# Patient Record
Sex: FEMALE | Race: WHITE | NOT HISPANIC OR LATINO | Employment: FULL TIME | URBAN - METROPOLITAN AREA
[De-identification: names, ages, dates, MRNs, and addresses within clinical notes are randomized per-mention and may not be internally consistent; named-entity substitution may affect disease eponyms.]

---

## 2017-02-08 ENCOUNTER — ALLSCRIPTS OFFICE VISIT (OUTPATIENT)
Dept: OTHER | Facility: OTHER | Age: 57
End: 2017-02-08

## 2017-07-18 ENCOUNTER — HOSPITAL ENCOUNTER (OUTPATIENT)
Dept: RADIOLOGY | Age: 57
Discharge: HOME | End: 2017-07-18
Payer: COMMERCIAL

## 2017-07-18 DIAGNOSIS — Z12.31 ENCOUNTER FOR SCREENING MAMMOGRAM FOR MALIGNANT NEOPLASM OF BREAST: ICD-10-CM

## 2017-07-18 PROCEDURE — G0202 SCR MAMMO BI INCL CAD: HCPCS

## 2017-07-19 ENCOUNTER — GENERIC CONVERSION - ENCOUNTER (OUTPATIENT)
Dept: OTHER | Facility: OTHER | Age: 57
End: 2017-07-19

## 2017-08-10 ENCOUNTER — ALLSCRIPTS OFFICE VISIT (OUTPATIENT)
Dept: OTHER | Facility: OTHER | Age: 57
End: 2017-08-10

## 2018-01-12 VITALS
SYSTOLIC BLOOD PRESSURE: 130 MMHG | DIASTOLIC BLOOD PRESSURE: 72 MMHG | WEIGHT: 227 LBS | BODY MASS INDEX: 40.22 KG/M2 | HEIGHT: 63 IN

## 2018-01-14 VITALS
DIASTOLIC BLOOD PRESSURE: 78 MMHG | SYSTOLIC BLOOD PRESSURE: 114 MMHG | OXYGEN SATURATION: 97 % | TEMPERATURE: 100 F | BODY MASS INDEX: 38.68 KG/M2 | RESPIRATION RATE: 14 BRPM | HEIGHT: 63 IN | WEIGHT: 218.31 LBS | HEART RATE: 88 BPM

## 2018-04-18 ENCOUNTER — OFFICE VISIT (OUTPATIENT)
Dept: SURGICAL ONCOLOGY | Facility: CLINIC | Age: 58
End: 2018-04-18
Payer: COMMERCIAL

## 2018-04-18 VITALS
RESPIRATION RATE: 16 BRPM | HEIGHT: 63 IN | DIASTOLIC BLOOD PRESSURE: 70 MMHG | SYSTOLIC BLOOD PRESSURE: 130 MMHG | WEIGHT: 231 LBS | BODY MASS INDEX: 40.93 KG/M2 | TEMPERATURE: 98.2 F | HEART RATE: 78 BPM

## 2018-04-18 DIAGNOSIS — N60.19 FIBROCYSTIC BREAST CHANGES, UNSPECIFIED LATERALITY: Primary | ICD-10-CM

## 2018-04-18 DIAGNOSIS — Z12.31 SCREENING MAMMOGRAM, ENCOUNTER FOR: ICD-10-CM

## 2018-04-18 DIAGNOSIS — Z80.3 FAMILY HISTORY OF BREAST CANCER IN FEMALE: ICD-10-CM

## 2018-04-18 PROCEDURE — 99213 OFFICE O/P EST LOW 20 MIN: CPT | Performed by: SURGERY

## 2018-04-18 RX ORDER — LEVOTHYROXINE SODIUM 0.05 MG/1
TABLET ORAL
COMMUNITY

## 2018-04-18 RX ORDER — SIMVASTATIN 20 MG
TABLET ORAL
COMMUNITY

## 2018-04-18 RX ORDER — ALBUTEROL SULFATE 90 UG/1
AEROSOL, METERED RESPIRATORY (INHALATION)
COMMUNITY
Start: 2018-01-27

## 2018-04-18 RX ORDER — FENOFIBRATE 67 MG/1
CAPSULE ORAL
COMMUNITY

## 2018-04-18 RX ORDER — CHLORTHALIDONE 25 MG/1
TABLET ORAL
Refills: 1 | COMMUNITY
Start: 2018-02-20

## 2018-04-18 RX ORDER — AMLODIPINE BESYLATE 5 MG/1
TABLET ORAL
COMMUNITY
Start: 2017-11-27

## 2018-04-18 NOTE — PROGRESS NOTES
Surgical Oncology Follow Up       [facility]  [facility] SURGICAL ONCOLOGY [address]  [place] [address] [address]    Patricia Stagers  1960  [id number]  [address]  C[facility]ER [facility] SURGICAL ONCOLOGY [place]  [address] [address]    Chief Complaint   Patient presents with    Breast Problem     Pt is here for 1 year follow up        Assessment/Plan   Diagnoses and all orders for this visit:    Fibrocystic breast changes, unspecified laterality    Family history of breast cancer in female    Screening mammogram, encounter for  -     Mammo screening bilateral w 3d & cad; Future    Other orders  -     albuterol (PROVENTIL HFA,VENTOLIN HFA) 90 mcg/act inhaler; Take 1-2 puffs every 4-6 hours as needed for chest tightness  -     amLODIPine (NORVASC) 5 mg tablet; TAKE 1 TABLET (5 MG TOTAL) BY MOUTH DAILY  -     chlorthalidone 25 mg tablet; TAKE 1 TABLET (25 MG TOTAL) BY MOUTH DAILY  -     fenofibrate micronized (LOFIBRA) 67 MG capsule; Take by mouth  -     levothyroxine 50 mcg tablet; Take by mouth  -     simvastatin (ZOCOR) 20 mg tablet; Take by mouth        Advance Care Planning/Advance Directives:  Did not discuss  with the patient  Oncology History:     No history exists  History of Present Illness: fibrocystic, family history, dense tissue  -Interval History:none    Review of Systems:  Review of Systems   Constitutional: Negative  Negative for appetite change and fever  Eyes: Negative  Respiratory: Negative for shortness of breath  Cardiovascular: Negative  Gastrointestinal: Negative  Endocrine: Negative  Genitourinary: Negative  Musculoskeletal: Negative  Negative for arthralgias and myalgias  Skin: Negative  Allergic/Immunologic: Negative  Neurological: Negative  Hematological: Negative  Negative for adenopathy  Does not bruise/bleed easily  Psychiatric/Behavioral: Negative          Patient Active Problem List Diagnosis    Fibrocystic breast changes    Family history of breast cancer in female    Screening mammogram, encounter for     Past Medical History:   Diagnosis Date    Asthma     Candidiasis     Depression     Disease of thyroid gland     Dysuria     Fibrocystic breast changes     H/O allergy     Hyperlipidemia     Hypertension     Mitral valve prolapse      Past Surgical History:   Procedure Laterality Date    HEEL SPUR SURGERY Bilateral     [id number]    SHOULDER SURGERY      FROZEN SHOULDER     Family History   Problem Relation Age of Onset    Breast cancer Mother 79     ADOPTED MOTHER     Lung cancer Father      UNKNOWN      Social History     Social History    Marital status: /Civil Union     Spouse name: N/A    Number of children: N/A    Years of education: N/A     Occupational History    Not on file  Social History Main Topics    Smoking status: Never Smoker    Smokeless tobacco: Never Used    Alcohol use No    Drug use: No    Sexual activity: Not on file     Other Topics Concern    Not on file     Social History Narrative    No narrative on file       Current Outpatient Prescriptions:     albuterol (PROVENTIL HFA,VENTOLIN HFA) 90 mcg/act inhaler, Take 1-2 puffs every 4-6 hours as needed for chest tightness  , Disp: , Rfl:     amLODIPine (NORVASC) 5 mg tablet, TAKE 1 TABLET (5 MG TOTAL) BY MOUTH DAILY  , Disp: , Rfl:     chlorthalidone 25 mg tablet, TAKE 1 TABLET (25 MG TOTAL) BY MOUTH DAILY  , Disp: , Rfl: 1    fenofibrate micronized (LOFIBRA) 67 MG capsule, Take by mouth, Disp: , Rfl:     levothyroxine 50 mcg tablet, Take by mouth, Disp: , Rfl:     simvastatin (ZOCOR) 20 mg tablet, Take by mouth, Disp: , Rfl:   Allergies   Allergen Reactions    Ace Inhibitors     Avelox [Moxifloxacin]     Erythromycin        The following portions of the patient's history were reviewed and updated as appropriate: allergies, current medications, past family history, past medical history, past social history, past surgical history and problem list         Vitals:    04/18/18 1527   BP: 130/70   Pulse: 78   Resp: 16   Temp: 98 2 °F (36 8 °C)       Physical Exam   Constitutional: She is oriented to person, place, and time  She appears well-developed and well-nourished  HENT:   Head: Normocephalic and atraumatic  Pulmonary/Chest: Right breast exhibits no inverted nipple, no mass, no nipple discharge, no skin change and no tenderness  Left breast exhibits no inverted nipple, no mass, no nipple discharge, no skin change and no tenderness  Lymphadenopathy:        Right axillary: No pectoral and no lateral adenopathy present  Left axillary: No pectoral and no lateral adenopathy present  Right: No supraclavicular adenopathy present  Left: No supraclavicular adenopathy present  Neurological: She is alert and oriented to person, place, and time  Psychiatric: She has a normal mood and affect  Results:      Imaging  07/18/2017 bilateral screening mammogram is benign BI-RADS one density three    I reviewed the above imaging data  Discussion/Summary:  10-year-old female with fibrocystic changes and dense breast tissue  She also has a family history of breast cancer in her mother  There are no concerns on examination today  Her mammogram last year was benign  I will make arrangements for a mammogram for this year  I am recommending a 3D mammogram given the dense tissue  Provided there are no concerns, I will see her again in one year for a clinical exam or sooner should the need arise

## 2018-07-24 ENCOUNTER — HOSPITAL ENCOUNTER (OUTPATIENT)
Dept: MAMMOGRAPHY | Facility: MEDICAL CENTER | Age: 58
Discharge: HOME | End: 2018-07-24
Payer: COMMERCIAL

## 2018-07-24 DIAGNOSIS — Z12.31 SCREENING MAMMOGRAM, ENCOUNTER FOR: ICD-10-CM

## 2018-07-24 PROCEDURE — 77067 SCR MAMMO BI INCL CAD: CPT

## 2018-07-24 PROCEDURE — 77063 BREAST TOMOSYNTHESIS BI: CPT

## 2018-09-13 ENCOUNTER — ANNUAL EXAM (OUTPATIENT)
Dept: OBGYN CLINIC | Facility: MEDICAL CENTER | Age: 58
End: 2018-09-13
Payer: COMMERCIAL

## 2018-09-13 VITALS — WEIGHT: 233 LBS | BODY MASS INDEX: 41.27 KG/M2 | DIASTOLIC BLOOD PRESSURE: 74 MMHG | SYSTOLIC BLOOD PRESSURE: 132 MMHG

## 2018-09-13 DIAGNOSIS — T75.3XXD MOTION SICKNESS, SUBSEQUENT ENCOUNTER: ICD-10-CM

## 2018-09-13 DIAGNOSIS — Z01.419 ENCOUNTER FOR WELL WOMAN EXAM WITH ROUTINE GYNECOLOGICAL EXAM: Primary | ICD-10-CM

## 2018-09-13 PROCEDURE — S0612 ANNUAL GYNECOLOGICAL EXAMINA: HCPCS | Performed by: OBSTETRICS & GYNECOLOGY

## 2018-09-13 RX ORDER — SCOLOPAMINE TRANSDERMAL SYSTEM 1 MG/1
1 PATCH, EXTENDED RELEASE TRANSDERMAL
Qty: 6 PATCH | Refills: 0 | Status: SHIPPED | OUTPATIENT
Start: 2018-09-13 | End: 2019-04-10 | Stop reason: HOSPADM

## 2018-09-13 NOTE — PROGRESS NOTES
ASSESSMENT & PLAN: Brittany Maurice is a 60 y o   with normal gynecologic exam     1   Routine well woman exam done today  2  Pap and HPV:  The patient's last pap and hpv was   It was normal     Pap and cotesting was not done today  Current ASCCP Guidelines reviewed  Due   3  Mammogram ordered - follows with Dr Micah Hays  4  Colonoscopy due in 1 year  5  The following were reviewed in today's visit: breast self exam      CC:  Annual Gynecologic Examination    HPI: Brittany Maurice is a 60 y o   who presents for annual gynecologic examination  She has the following concerns:  none    Health Maintenance:    She wears her seatbelt routinely  She does perform regular monthly self breast exams  She feels safe at home  Pap: 2015  Last mammogram: 2017  Last colonoscopy: due 2019    Past Medical History:   Diagnosis Date    Asthma     Candidiasis     Depression     Disease of thyroid gland     Dysuria     Fibrocystic breast changes     H/O allergy     Hyperlipidemia     Hypertension     Mitral valve prolapse        Past Surgical History:   Procedure Laterality Date    HEEL SPUR SURGERY Bilateral     [id number]    SHOULDER SURGERY      FROZEN SHOULDER       Past OB/Gyn History:  OB History      Para Term  AB Living    1 0            SAB TAB Ectopic Multiple Live Births                     Obstetric Comments    MENARCHE:AGE 10   NO LIVE BIRTHS  NEVER USED HORMONE REPLACEMENTS  NO BIRTH CONTROL METHODS  Pt does not have menstrual issues      History of sexually transmitted infection: No   History of abnormal pap smears: No          Family History   Problem Relation Age of Onset    Breast cancer Mother 79        ADOPTED MOTHER     Lung cancer Father         UNKNOWN        Social History:  Social History     Social History    Marital status: /Civil Union     Spouse name: N/A    Number of children: N/A    Years of education: N/A     Occupational History    Not on file  Social History Main Topics    Smoking status: Never Smoker    Smokeless tobacco: Never Used    Alcohol use No    Drug use: No    Sexual activity: No     Other Topics Concern    Not on file     Social History Narrative    No narrative on file       Allergies   Allergen Reactions    Ace Inhibitors     Avelox [Moxifloxacin]     Erythromycin        Current Outpatient Prescriptions:     amLODIPine (NORVASC) 5 mg tablet, TAKE 1 TABLET (5 MG TOTAL) BY MOUTH DAILY  , Disp: , Rfl:     chlorthalidone 25 mg tablet, TAKE 1 TABLET (25 MG TOTAL) BY MOUTH DAILY  , Disp: , Rfl: 1    fenofibrate micronized (LOFIBRA) 67 MG capsule, Take by mouth, Disp: , Rfl:     levothyroxine 50 mcg tablet, Take by mouth, Disp: , Rfl:     simvastatin (ZOCOR) 20 mg tablet, Take by mouth, Disp: , Rfl:     albuterol (PROVENTIL HFA,VENTOLIN HFA) 90 mcg/act inhaler, Take 1-2 puffs every 4-6 hours as needed for chest tightness  , Disp: , Rfl:     scopolamine (TRANSDERM-SCOP) 1 5 mg/3 days TD 72 hr patch, Place 1 patch on the skin every third day, Disp: 6 patch, Rfl: 0      Review of Systems  Constitutional :no fever, feels well, no tiredness, no recent weight gain or loss  ENT: no ear ache, no loss of hearing, no nosebleeds or nasal discharge, no sore throat or hoarseness  Cardiovascular: no complaints of slow or fast heart beat, no chest pain, no palpitations, no leg claudication or lower extremity edema  Respiratory: no complaints of shortness of shortness of breath, no BAPTISTE  Breasts:no complaints of breast pain, breast lump, or nipple discharge  Gastrointestinal: no complaints of abdominal pain, constipation, nausea, vomiting, or diarrhea or bloody stools  Genitourinary : no complaints of dysuria, incontinence, pelvic pain, no dysmenorrhea, vaginal discharge or abnormal vaginal bleeding and as noted in HPI    Musculoskeletal: no complaints of arthralgia, no myalgia, no joint swelling or stiffness, no limb pain or swelling  Integumentary: no complaints of skin rash or lesion, itching or dry skin  Neurological: no complaints of headache, no confusion, no numbness or tingling, no dizziness or fainting    Objective      /74   Wt 106 kg (233 lb)   BMI 41 27 kg/m²     General:   appears stated age, cooperative, alert normal mood and affect   Neck: normal, supple,trachea midline, no masses   Heart: regular rate and rhythm, S1, S2 normal, no murmur, click, rub or gallop   Lungs: clear to auscultation bilaterally   Breasts: normal appearance, no masses or tenderness, Inspection negative, No nipple retraction or dimpling, No nipple discharge or bleeding, No axillary or supraclavicular adenopathy, Normal to palpation without dominant masses   Abdomen: soft, non-tender, without masses or organomegaly   Vulva: normal female genitalia, Bartholin's, Urethra, [place] normal, no lesions, normal female hair distribution   Vagina: normal vagina, no discharge, exudate, lesion, or erythema   Urethra: normal   Cervix: Normal, no discharge  Uterus: normal size, contour, position, consistency, mobility, non-tender   Adnexa: normal adnexa   Lymphatic palpation of lymph nodes in neck, axilla, groin and/or other locations: no lymphadenopathy or masses noted   Skin normal skin turgor and no rashes     Psychiatric orientation to person, place, and time: normal  mood and affect: normal

## 2019-04-10 ENCOUNTER — OFFICE VISIT (OUTPATIENT)
Dept: SURGICAL ONCOLOGY | Facility: CLINIC | Age: 59
End: 2019-04-10
Payer: COMMERCIAL

## 2019-04-10 VITALS
TEMPERATURE: 99.5 F | HEART RATE: 83 BPM | DIASTOLIC BLOOD PRESSURE: 90 MMHG | RESPIRATION RATE: 14 BRPM | WEIGHT: 239 LBS | SYSTOLIC BLOOD PRESSURE: 140 MMHG | HEIGHT: 63 IN | BODY MASS INDEX: 42.35 KG/M2

## 2019-04-10 DIAGNOSIS — Z12.31 SCREENING MAMMOGRAM, ENCOUNTER FOR: ICD-10-CM

## 2019-04-10 DIAGNOSIS — N60.19 FIBROCYSTIC BREAST CHANGES, UNSPECIFIED LATERALITY: Primary | ICD-10-CM

## 2019-04-10 DIAGNOSIS — Z80.3 FAMILY HISTORY OF BREAST CANCER IN FEMALE: ICD-10-CM

## 2019-04-10 PROCEDURE — 99213 OFFICE O/P EST LOW 20 MIN: CPT | Performed by: SURGERY

## 2019-04-10 RX ORDER — METHYLPREDNISOLONE 4 MG/1
TABLET ORAL
COMMUNITY
Start: 2019-04-10 | End: 2019-04-16

## 2019-08-04 DIAGNOSIS — Z12.31 SCREENING MAMMOGRAM, ENCOUNTER FOR: ICD-10-CM

## 2019-09-18 ENCOUNTER — ANNUAL EXAM (OUTPATIENT)
Dept: OBGYN CLINIC | Facility: MEDICAL CENTER | Age: 59
End: 2019-09-18
Payer: COMMERCIAL

## 2019-09-18 VITALS — SYSTOLIC BLOOD PRESSURE: 122 MMHG | DIASTOLIC BLOOD PRESSURE: 62 MMHG | BODY MASS INDEX: 42.37 KG/M2 | WEIGHT: 239.2 LBS

## 2019-09-18 DIAGNOSIS — Z12.31 ENCOUNTER FOR SCREENING MAMMOGRAM FOR MALIGNANT NEOPLASM OF BREAST: ICD-10-CM

## 2019-09-18 DIAGNOSIS — Z01.419 ENCOUNTER FOR WELL WOMAN EXAM WITH ROUTINE GYNECOLOGICAL EXAM: Primary | ICD-10-CM

## 2019-09-18 PROCEDURE — S0612 ANNUAL GYNECOLOGICAL EXAMINA: HCPCS | Performed by: OBSTETRICS & GYNECOLOGY

## 2019-09-18 NOTE — PATIENT INSTRUCTIONS
Thank you for your confidence in our team    We appreciate you and welcome your feedback  If you receive a survey from us, please take a few moments to let us know how we are doing     Sincerely,   Osvaldo Grider MD

## 2019-09-18 NOTE — PROGRESS NOTES
ASSESSMENT & PLAN: Raciel Chandler is a 60 y o   with normal gynecologic exam     1   Routine well woman exam done today  2  Pap and HPV:  The patient's last pap and hpv was   It was normal     Pap and cotesting was not done today  Current ASCCP Guidelines reviewed  Due   3  Mammogram ordered - follows with Dr Alesha Mercado  4  Colonoscopy done   5  The following were reviewed in today's visit: breast self exam      CC:  Annual Gynecologic Examination    HPI: Raciel Chandler is a 60 y o   who presents for annual gynecologic examination  She has the following concerns:  none    Health Maintenance:    She wears her seatbelt routinely  She does perform regular monthly self breast exams  She feels safe at home  Pap:   Last mammogram: 2019  Last colonoscopy:     Past Medical History:   Diagnosis Date    Asthma     Candidiasis     Depression     Disease of thyroid gland     Dysuria     Fibrocystic breast changes     H/O allergy     Hyperlipidemia     Hypertension     Mitral valve prolapse        Past Surgical History:   Procedure Laterality Date    HEEL SPUR SURGERY Bilateral     [id number]    SHOULDER SURGERY      FROZEN SHOULDER       Past OB/Gyn History:  OB History        1    Para   0    Term                AB   1    Living   0       SAB   1    TAB        Ectopic        Multiple        Live Births   0           Obstetric Comments   MENARCHE:AGE 10   NO LIVE BIRTHS  NEVER USED HORMONE REPLACEMENTS  NO BIRTH CONTROL METHODS  Pt does not have menstrual issues      History of sexually transmitted infection: No   History of abnormal pap smears: No          Family History   Problem Relation Age of Onset   [place] Breast cancer Mother 79        Mother was adopted    Lung cancer Father         UNKNOWN        Social History:  Social History     Socioeconomic History    Marital status: /Civil Union     Spouse name: Not on file    Number of children: Not on file    Years of education: Not on file    Highest education level: Not on file   Occupational History    Not on file   Social Needs    Financial resource strain: Not on file    Food insecurity:     Worry: Not on file     Inability: Not on file    Transportation needs:     Medical: Not on file     Non-medical: Not on file   Tobacco Use    Smoking status: Never Smoker    Smokeless tobacco: Never Used   Substance and Sexual Activity    Alcohol use: No    Drug use: No    Sexual activity: Never     Partners: Male     Birth control/protection: Post-menopausal   Lifestyle    Physical activity:     Days per week: Not on file     Minutes per session: Not on file    Stress: Not on file   Relationships    Social connections:     Talks on phone: Not on file     Gets together: Not on file     Attends [employer] service: Not on file     Active member of club or organization: Not on file     Attends meetings of clubs or organizations: Not on file     Relationship status: Not on file    Intimate partner violence:     Fear of current or ex partner: Not on file     Emotionally abused: Not on file     Physically abused: Not on file     Forced sexual activity: Not on file   Other Topics Concern    Not on file   Social History Narrative    Not on file       Allergies   Allergen Reactions    Ace Inhibitors     Avelox [Moxifloxacin]     Erythromycin        Current Outpatient Medications:     albuterol (PROVENTIL HFA,VENTOLIN HFA) 90 mcg/act inhaler, Take 1-2 puffs every 4-6 hours as needed for chest tightness  , Disp: , Rfl:     amLODIPine (NORVASC) 5 mg tablet, TAKE 1 TABLET (5 MG TOTAL) BY MOUTH DAILY  , Disp: , Rfl:     chlorthalidone 25 mg tablet, TAKE 1 TABLET (25 MG TOTAL) BY MOUTH DAILY  , Disp: , Rfl: 1    fenofibrate micronized (LOFIBRA) 67 MG capsule, Take by mouth, Disp: , Rfl:     levothyroxine 50 mcg tablet, Take by mouth, Disp: , Rfl:     simvastatin (ZOCOR) 20 mg tablet, Take by mouth, Disp: , Rfl:       Review of Systems  Constitutional :no fever, feels well, no tiredness, no recent weight gain or loss  ENT: no ear ache, no loss of hearing, no nosebleeds or nasal discharge, no sore throat or hoarseness  Cardiovascular: no complaints of slow or fast heart beat, no chest pain, no palpitations, no leg claudication or lower extremity edema  Respiratory: no complaints of shortness of shortness of breath, no BAPTISTE  Breasts:no complaints of breast pain, breast lump, or nipple discharge  Gastrointestinal: no complaints of abdominal pain, constipation, nausea, vomiting, or diarrhea or bloody stools  Genitourinary : no complaints of dysuria, incontinence, pelvic pain, no dysmenorrhea, vaginal discharge or abnormal vaginal bleeding and as noted in HPI  Musculoskeletal: no complaints of arthralgia, no myalgia, no joint swelling or stiffness, no limb pain or swelling  Integumentary: no complaints of skin rash or lesion, itching or dry skin  Neurological: no complaints of headache, no confusion, no numbness or tingling, no dizziness or fainting    Objective      /62   Wt 109 kg (239 lb 3 2 oz)   BMI 42 37 kg/m²     General:   appears stated age, cooperative, alert normal mood and affect   Neck: normal, supple,trachea midline, no masses   Heart: regular rate and rhythm, S1, S2 normal, no murmur, click, rub or gallop   Lungs: clear to auscultation bilaterally   Breasts: normal appearance, no masses or tenderness, Inspection negative, No nipple retraction or dimpling, No nipple discharge or bleeding, No axillary or supraclavicular adenopathy, Normal to palpation without dominant masses   Abdomen: soft, non-tender, without masses or organomegaly   Vulva: normal female genitalia, Bartholin's, Urethra, [place] normal, no lesions, normal female hair distribution   Vagina: normal vagina, no discharge, exudate, lesion, or erythema   Urethra: normal   Cervix: Normal, no discharge     Uterus: normal size, contour, position, consistency, mobility, non-tender   Adnexa: normal adnexa   Lymphatic palpation of lymph nodes in neck, axilla, groin and/or other locations: no lymphadenopathy or masses noted   Skin normal skin turgor and no rashes     Psychiatric orientation to person, place, and time: normal  mood and affect: normal

## 2020-07-30 ENCOUNTER — OFFICE VISIT (OUTPATIENT)
Dept: SURGICAL ONCOLOGY | Facility: CLINIC | Age: 60
End: 2020-07-30
Payer: COMMERCIAL

## 2020-07-30 VITALS
WEIGHT: 235 LBS | DIASTOLIC BLOOD PRESSURE: 78 MMHG | TEMPERATURE: 96.8 F | RESPIRATION RATE: 16 BRPM | HEART RATE: 45 BPM | SYSTOLIC BLOOD PRESSURE: 130 MMHG | HEIGHT: 63 IN | BODY MASS INDEX: 41.64 KG/M2

## 2020-07-30 DIAGNOSIS — N60.19 FIBROCYSTIC BREAST CHANGES, UNSPECIFIED LATERALITY: Primary | ICD-10-CM

## 2020-07-30 DIAGNOSIS — Z12.31 SCREENING MAMMOGRAM, ENCOUNTER FOR: ICD-10-CM

## 2020-07-30 DIAGNOSIS — Z80.3 FAMILY HISTORY OF BREAST CANCER IN FEMALE: ICD-10-CM

## 2020-07-30 PROCEDURE — 99213 OFFICE O/P EST LOW 20 MIN: CPT | Performed by: SURGERY

## 2020-07-30 RX ORDER — ATORVASTATIN CALCIUM 20 MG/1
TABLET, FILM COATED ORAL
COMMUNITY
Start: 2020-03-12

## 2020-07-30 NOTE — PROGRESS NOTES
Surgical Oncology Follow Up       [address] [address]  [facility] [address]    Arely Cary  1960  [id number]  [address] [facility]COL[facility] [place]  [place] [address]    No chief complaint on file  Assessment/Plan   Diagnoses and all orders for this visit:    Fibrocystic breast changes, unspecified laterality    Screening mammogram, encounter for  -     Mammo screening bilateral w 3d & cad; Future    Family history of breast cancer in female    Other orders  -     atorvastatin (LIPITOR) 20 mg tablet; atorvastatin 20 mg tablet  -     Dulaglutide (Trulicity) 1 5 IO/1 2SV SOPN; Trulicity 1 5 KZ/6 0 mL subcutaneous pen injector        Advance Care Planning/Advance Directives:  Did not discuss  with the patient  Oncology History:     No history exists  History of Present Illness: Follow-up visit secondary to fibrocystic changes and family history of breast cancer, no concerns  -Interval History:  Has not had her mammogram but states it is scheduled for this coming Monday    Review of Systems:  Review of Systems   Constitutional: Negative  Negative for appetite change and fever  Eyes: Negative  Respiratory: Negative for shortness of breath  Cardiovascular: Negative  Gastrointestinal: Negative  Endocrine: Negative  Genitourinary: Negative  Musculoskeletal: Negative  Negative for arthralgias and myalgias  Skin: Negative  Allergic/Immunologic: Negative  Neurological: Negative  Hematological: Negative  Negative for adenopathy  Does not bruise/bleed easily  Psychiatric/Behavioral: Negative          Patient Active Problem List   Diagnosis    Fibrocystic breast changes    Family history of breast cancer in female    Screening mammogram, encounter for     Past Medical History:   Diagnosis Date    Asthma     Candidiasis     Depression     Disease of thyroid gland     Dysuria     Fibrocystic breast changes     H/O allergy     Hyperlipidemia     Hypertension     Mitral valve prolapse      Past Surgical History:   Procedure Laterality Date    HEEL SPUR SURGERY Bilateral     [id number]    SHOULDER SURGERY      FROZEN SHOULDER     Family History   Problem Relation Age of Onset   Yary Navarrete Breast cancer Mother 79        Mother was adopted    Lung cancer Father         UNKNOWN      Social History     Socioeconomic History    Marital status: /Civil Union     Spouse name: Not on file    Number of children: Not on file    Years of education: Not on file    Highest education level: Not on file   Occupational History    Not on file   Social Needs    Financial resource strain: Not on file    Food insecurity:     Worry: Not on file     Inability: Not on file    Transportation needs:     Medical: Not on file     Non-medical: Not on file   Tobacco Use    Smoking status: Never Smoker    Smokeless tobacco: Never Used   Substance and Sexual Activity    Alcohol use: No    Drug use: No    Sexual activity: Never     Partners: Male     Birth control/protection: Post-menopausal   Lifestyle    Physical activity:     Days per week: Not on file     Minutes per session: Not on file    Stress: Not on file   Relationships    Social connections:     Talks on phone: Not on file     Gets together: Not on file     Attends [employer]ism service: Not on file     Active member of club or organization: Not on file     Attends meetings of clubs or organizations: Not on file     Relationship status: Not on file    Intimate partner violence:     Fear of current or ex partner: Not on file     Emotionally abused: Not on file     Physically abused: Not on file     Forced sexual activity: Not on file   Other Topics Concern    Not on file   Social History Narrative    Not on file       Current Outpatient Medications:     albuterol (PROVENTIL HFA,VENTOLIN HFA) 90 mcg/act inhaler, Take 1-2 puffs every 4-6 hours as needed for chest tightness  , Disp: , Rfl:     amLODIPine (NORVASC) 5 mg tablet, TAKE 1 TABLET (5 MG TOTAL) BY MOUTH DAILY  , Disp: , Rfl:     atorvastatin (LIPITOR) 20 mg tablet, atorvastatin 20 mg tablet, Disp: , Rfl:     chlorthalidone 25 mg tablet, TAKE 1 TABLET (25 MG TOTAL) BY MOUTH DAILY  , Disp: , Rfl: 1    Dulaglutide (Trulicity) 1 5 CM/1 7AD SOPN, Trulicity 1 5 GP/9 5 mL subcutaneous pen injector, Disp: , Rfl:     levothyroxine 50 mcg tablet, Take by mouth, Disp: , Rfl:     fenofibrate micronized (LOFIBRA) 67 MG capsule, Take by mouth, Disp: , Rfl:     simvastatin (ZOCOR) 20 mg tablet, Take by mouth, Disp: , Rfl:   Allergies   Allergen Reactions    Ace Inhibitors Other (See Comments)     unknown    Avelox [Moxifloxacin] Swelling    Erythromycin Other (See Comments)     Cramping, abdominal pain       The following portions of the patient's history were reviewed and updated as appropriate: allergies, current medications, past family history, past medical history, past social history, past surgical history and problem list         Vitals:    07/30/20 0948   BP: 130/78   Pulse: (!) 45   Resp: 16   Temp: (!) 96 8 °F (36 °C)       Physical Exam   Constitutional: She is oriented to person, place, and time  She appears well-developed and well-nourished  HENT:   Head: Normocephalic and atraumatic  Pulmonary/Chest: Right breast exhibits no inverted nipple, no mass, no nipple discharge, no skin change and no tenderness  Left breast exhibits no inverted nipple, no mass, no nipple discharge, no skin change and no tenderness  Lymphadenopathy:        Right axillary: No pectoral and no lateral adenopathy present  Left axillary: No pectoral and no lateral adenopathy present  Right: No supraclavicular adenopathy present  Left: No supraclavicular adenopathy present  Neurological: She is alert and oriented to person, place, and time     Psychiatric: She has a normal mood and affect  Data:  07/29/2019 bilateral 3D screening mammogram is benign BI-RADS one with a density of two      Discussion/Summary:  48-year-old female with fibrocystic changes and family history of breast cancer  There are no concerns on examination today  Her mammogram from one year ago was benign  She is scheduled for this years mammogram in a few days    Provided there are no concerns, I will see her again in April of 2021 so that I stagger her visits with her gynecologist

## 2020-08-07 DIAGNOSIS — Z12.31 SCREENING MAMMOGRAM, ENCOUNTER FOR: ICD-10-CM

## 2020-09-29 ENCOUNTER — ANNUAL EXAM (OUTPATIENT)
Dept: OBGYN CLINIC | Facility: CLINIC | Age: 60
End: 2020-09-29
Payer: COMMERCIAL

## 2020-09-29 VITALS — WEIGHT: 238.5 LBS | DIASTOLIC BLOOD PRESSURE: 70 MMHG | SYSTOLIC BLOOD PRESSURE: 130 MMHG | BODY MASS INDEX: 42.25 KG/M2

## 2020-09-29 DIAGNOSIS — Z01.419 ENCOUNTER FOR WELL WOMAN EXAM WITH ROUTINE GYNECOLOGICAL EXAM: Primary | ICD-10-CM

## 2020-09-29 PROCEDURE — S0612 ANNUAL GYNECOLOGICAL EXAMINA: HCPCS | Performed by: OBSTETRICS & GYNECOLOGY

## 2020-09-29 NOTE — PROGRESS NOTES
[facility] [address], [address], [place], [place]    ASSESSMENT/PLAN: Aneudy Kohler is a 63 y o  Destiny Mustache who presents for annual gynecologic exam   1  Routine well woman exam completed today  2  Cervical Cancer Screening: Current ASCCP Guidelines reviewed  Last Pap: 2015  Next Pap Due: 2020  3  Breast cancer screening: mammo done August 2020; follows with Dr Roxana Aparicio  4  Colon cancer screening: done 2017      CC: Annual Gynecologic Examination    HPI: Aneudy Kohler is a 63 y o  Destiny Mustache who presents for annual gynecologic examination  No gyn complaints; doing well      The following portions of the patient's history were reviewed and updated as appropriate: allergies, current medications, past family history, past medical history, obstetric history, gynecologic history, past social history, past surgical history and problem list     Review of Systems   Constitutional: Negative  HENT: Negative  Eyes: Negative  Respiratory: Negative  Cardiovascular: Negative  Gastrointestinal: Negative  Genitourinary: Negative  Musculoskeletal: Negative  All other systems reviewed and are negative  Objective: There were no vitals taken for this visit  Physical Exam  Vitals signs reviewed  Constitutional:       General: She is not in acute distress  Appearance: She is well-developed  HENT:      Head: Normocephalic and atraumatic  Nose: Nose normal    Neck:      Musculoskeletal: Normal range of motion  Cardiovascular:      Rate and Rhythm: Normal rate  Pulmonary:      Effort: Pulmonary effort is normal  No respiratory distress  Chest:      Breasts: Breasts are symmetrical          Right: Normal  No mass, nipple discharge, skin change or tenderness  Left: Normal  No mass, nipple discharge, skin change or tenderness  Abdominal:      General: There is no distension  Palpations: Abdomen is soft  There is no mass  Tenderness:  There is no abdominal tenderness  There is no guarding or rebound  Genitourinary:     General: Normal vulva  Exam position: Lithotomy position  Labia:         Right: No lesion  Left: No lesion  Urethra: No prolapse (urethral meatus normal)  Vagina: Normal  No vaginal discharge, erythema or bleeding  Cervix: Normal       Uterus: Normal        Adnexa: Right adnexa normal and left adnexa normal       Comments: Pap done    Musculoskeletal: Normal range of motion  Lymphadenopathy:      Upper Body:      Right upper body: No supraclavicular, axillary or pectoral adenopathy  Left upper body: No supraclavicular, axillary or pectoral adenopathy  Lower Body: No left inguinal adenopathy  Skin:     General: Skin is warm and dry  Neurological:      Mental Status: She is alert and oriented to person, place, and time  Psychiatric:         Behavior: Behavior normal          Thought Content:  Thought content normal          Judgment: Judgment normal

## 2020-10-01 LAB
CLINICAL INFO: NORMAL
CYTO CVX: NORMAL
CYTOLOGY CMNT CVX/VAG CYTO-IMP: NORMAL
DATE PREVIOUS BX: NORMAL
HPV E6+E7 MRNA CVX QL NAA+PROBE: NOT DETECTED
LMP START DATE: NORMAL
SL AMB PREV. PAP:: NORMAL
SPECIMEN SOURCE CVX/VAG CYTO: NORMAL

## 2021-04-05 ENCOUNTER — OFFICE VISIT (OUTPATIENT)
Dept: SURGICAL ONCOLOGY | Facility: CLINIC | Age: 61
End: 2021-04-05
Payer: COMMERCIAL

## 2021-04-05 VITALS
HEART RATE: 76 BPM | BODY MASS INDEX: 41.82 KG/M2 | SYSTOLIC BLOOD PRESSURE: 126 MMHG | TEMPERATURE: 97.6 F | DIASTOLIC BLOOD PRESSURE: 82 MMHG | WEIGHT: 236 LBS | HEIGHT: 63 IN

## 2021-04-05 DIAGNOSIS — Z80.3 FAMILY HISTORY OF BREAST CANCER IN FEMALE: Primary | ICD-10-CM

## 2021-04-05 DIAGNOSIS — Z12.31 SCREENING MAMMOGRAM, ENCOUNTER FOR: ICD-10-CM

## 2021-04-05 DIAGNOSIS — N60.19 FIBROCYSTIC BREAST CHANGES, UNSPECIFIED LATERALITY: ICD-10-CM

## 2021-04-05 DIAGNOSIS — R92.2 DENSE BREAST TISSUE: ICD-10-CM

## 2021-04-05 PROBLEM — R92.30 DENSE BREAST TISSUE: Status: ACTIVE | Noted: 2021-04-05

## 2021-04-05 PROCEDURE — 99213 OFFICE O/P EST LOW 20 MIN: CPT | Performed by: SURGERY

## 2021-04-05 NOTE — PROGRESS NOTES
Surgical Oncology Follow Up       [address] [facility] SURGICAL ONCOLOGY [facility] [address]    Milly Paget  1960  [id number]  [address]  [facility] [place]  [place] [address]    Chief Complaint   Patient presents with    Follow-up       Assessment/Plan   Diagnoses and all orders for this visit:    Family history of breast cancer in female    Fibrocystic breast changes, unspecified laterality    Screening mammogram, encounter for  -     Mammo screening bilateral w 3d & cad; Future    Dense breast tissue        Advance Care Planning/Advance Directives:  Did not discuss  with the patient  Oncology History:    Oncology History    No history exists  History of Present Illness: Follow-up visit secondary to dense breast tissue, fibrocystic changes and family history of breast cancer, reports a recent fungal rash but no other concerns  -Interval History: recent mammogram    Review of Systems:  Review of Systems   Constitutional: Negative  Negative for appetite change and fever  Eyes: Negative  Respiratory: Negative for shortness of breath  Cardiovascular: Negative  Gastrointestinal: Negative  Endocrine: Negative  Genitourinary: Negative  Musculoskeletal: Negative  Negative for arthralgias and myalgias  Skin: Positive for rash (recent fungal rash, improved with powder)  Allergic/Immunologic: Negative  Neurological: Negative  Hematological: Negative  Negative for adenopathy  Does not bruise/bleed easily  Psychiatric/Behavioral: Negative          Patient Active Problem List   Diagnosis    Fibrocystic breast changes    Family history of breast cancer in female    Screening mammogram, encounter for    Dense breast tissue     Past Medical History:   Diagnosis Date    Asthma     Candidiasis     Depression     Disease of thyroid gland     Dysuria     Fibrocystic breast changes     H/O allergy     Hyperlipidemia     Hypertension     Mitral valve prolapse      Past Surgical History:   Procedure Laterality Date    HEEL SPUR SURGERY Bilateral     [id number]    SHOULDER SURGERY      FROZEN SHOULDER     Family History   Problem Relation Age of Onset   Jarrett Coker Breast cancer Mother 79        Mother was adopted    Lung cancer Father         UNKNOWN      Social History     Socioeconomic History    Marital status: /Civil Union     Spouse name: Not on file    Number of children: Not on file    Years of education: Not on file    Highest education level: Not on file   Occupational History    Not on file   Social Needs    Financial resource strain: Not on file    Food insecurity     Worry: Not on file     Inability: Not on file   [employer] needs     Medical: Not on file     Non-medical: Not on file   Tobacco Use    Smoking status: Never Smoker    Smokeless tobacco: Never Used   Substance and Sexual Activity    Alcohol use: No    Drug use: No    Sexual activity: Not Currently     Partners: Male     Birth control/protection: Post-menopausal   Lifestyle    Physical activity     Days per week: Not on file     Minutes per session: Not on file    Stress: Not on file   Relationships    Social connections     Talks on phone: Not on file     Gets together: Not on file     Attends Caodaism service: Not on file     Active member of club or organization: Not on file     Attends meetings of clubs or organizations: Not on file     Relationship status: Not on file    Intimate partner violence     Fear of current or ex partner: Not on file     Emotionally abused: Not on file     Physically abused: Not on file     Forced sexual activity: Not on file   Other Topics Concern    Not on file   Social History Narrative    Not on file       Current Outpatient Medications:     albuterol (PROVENTIL HFA,VENTOLIN HFA) 90 mcg/act inhaler, Take 1-2 puffs every 4-6 hours as needed for chest tightness  , Disp: , Rfl:     amLODIPine (NORVASC) 5 mg tablet, TAKE 1 TABLET (5 MG TOTAL) BY MOUTH DAILY  , Disp: , Rfl:     atorvastatin (LIPITOR) 20 mg tablet, atorvastatin 20 mg tablet, Disp: , Rfl:     chlorthalidone 25 mg tablet, TAKE 1 TABLET (25 MG TOTAL) BY MOUTH DAILY  , Disp: , Rfl: 1    Dulaglutide (Trulicity) 1 5 RH/8 7GJ SOPN, Trulicity 1 5 AB/9 8 mL subcutaneous pen injector, Disp: , Rfl:     levothyroxine 50 mcg tablet, Take by mouth, Disp: , Rfl:     simvastatin (ZOCOR) 20 mg tablet, Take by mouth, Disp: , Rfl:     fenofibrate micronized (LOFIBRA) 67 MG capsule, Take by mouth, Disp: , Rfl:   Allergies   Allergen Reactions    Ace Inhibitors Other (See Comments)     unknown    Avelox [Moxifloxacin] Swelling    Erythromycin Other (See Comments)     Cramping, abdominal pain       The following portions of the patient's history were reviewed and updated as appropriate: allergies, current medications, past family history, past medical history, past social history, past surgical history and problem list         Vitals:    04/05/21 1533   BP: 126/82   Pulse: 76   Temp: 97 6 °F (36 4 °C)       Physical Exam  Constitutional:       General: She is not in acute distress  Appearance: She is well-developed  HENT:      Head: Normocephalic and atraumatic  Chest:      Breasts:         Right: No inverted nipple, mass, nipple discharge, skin change or tenderness  Left: No inverted nipple, mass, nipple discharge, skin change or tenderness  Lymphadenopathy:      Upper Body:      Right upper body: No supraclavicular, axillary or pectoral adenopathy  Left upper body: No supraclavicular, axillary or pectoral adenopathy  Neurological:      Mental Status: She is alert and oriented to person, place, and time     Psychiatric:         Mood and Affect: Mood normal            Results:  Labs:      Imaging   08/06/2020 bilateral 3D screening mammogram is benign BI-RADS one with a density of three    I reviewed the above imaging data  Discussion/Summary: 48-year-old female with dense breast tissue, fibrocystic changes and family history of breast cancer  There are no concerns on examination today  Her last mammogram was benign  I will order a mammogram for this coming August   Provided there are no concerns, I will see her again in one year or sooner should the need arise

## 2021-09-29 NOTE — PROGRESS NOTES
[facility] [address], [address], [place], [place]    ASSESSMENT/PLAN: Sammi Schmidt is a 57 y o  Jovita Flaming who presents for annual gynecologic exam   1  Routine well woman exam completed today  2  Cervical Cancer Screening: Current ASCCP Guidelines reviewed  Last Pap: 2020  Next Pap Due: 2025  3  Breast cancer screening: mammo done August 2021; follows with Dr Rocio Welsh  4  Colon cancer screening: done 2017      CC: Annual Gynecologic Examination    HPI: Sammi Schmidt is a 57 y o  Jovita Flaming who presents for annual gynecologic examination  No gyn complaints; doing well      The following portions of the patient's history were reviewed and updated as appropriate: allergies, current medications, past family history, past medical history, obstetric history, gynecologic history, past social history, past surgical history and problem list     Review of Systems   Constitutional: Negative  HENT: Negative  Eyes: Negative  Respiratory: Negative  Cardiovascular: Negative  Gastrointestinal: Negative  Genitourinary: Negative  Musculoskeletal: Negative  All other systems reviewed and are negative  Objective:  /70   Ht 5' 3" (1 6 m)   Wt 108 kg (238 lb)   Breastfeeding No   BMI 42 16 kg/m²    Physical Exam  Vitals reviewed  Constitutional:       General: She is not in acute distress  Appearance: She is well-developed  HENT:      Head: Normocephalic and atraumatic  Nose: Nose normal    Cardiovascular:      Rate and Rhythm: Normal rate  Pulmonary:      Effort: Pulmonary effort is normal  No respiratory distress  Chest:      Breasts: Breasts are symmetrical          Right: Normal  No mass, nipple discharge, skin change or tenderness  Left: Normal  No mass, nipple discharge, skin change or tenderness  Abdominal:      General: There is no distension  Palpations: Abdomen is soft  There is no mass  Tenderness:  There is no abdominal tenderness  There is no guarding or rebound  Genitourinary:     General: Normal vulva  Exam position: Lithotomy position  Labia:         Right: No lesion  Left: No lesion  Urethra: No prolapse (urethral meatus normal)  Vagina: Normal  No vaginal discharge, erythema or bleeding  Cervix: Normal       Uterus: Normal        Adnexa: Right adnexa normal and left adnexa normal       Comments:     Musculoskeletal:         General: Normal range of motion  Cervical back: Normal range of motion  Lymphadenopathy:      Upper Body:      Right upper body: No supraclavicular, axillary or pectoral adenopathy  Left upper body: No supraclavicular, axillary or pectoral adenopathy  Lower Body: No left inguinal adenopathy  Skin:     General: Skin is warm and dry  Neurological:      Mental Status: She is alert and oriented to person, place, and time  Psychiatric:         Behavior: Behavior normal          Thought Content:  Thought content normal          Judgment: Judgment normal

## 2021-09-30 ENCOUNTER — ANNUAL EXAM (OUTPATIENT)
Dept: OBGYN CLINIC | Facility: MEDICAL CENTER | Age: 61
End: 2021-09-30
Payer: COMMERCIAL

## 2021-09-30 VITALS
WEIGHT: 238 LBS | DIASTOLIC BLOOD PRESSURE: 70 MMHG | BODY MASS INDEX: 42.17 KG/M2 | HEIGHT: 63 IN | SYSTOLIC BLOOD PRESSURE: 128 MMHG

## 2021-09-30 DIAGNOSIS — Z12.31 ENCOUNTER FOR SCREENING MAMMOGRAM FOR MALIGNANT NEOPLASM OF BREAST: ICD-10-CM

## 2021-09-30 DIAGNOSIS — Z01.419 ENCOUNTER FOR WELL WOMAN EXAM WITH ROUTINE GYNECOLOGICAL EXAM: Primary | ICD-10-CM

## 2021-09-30 PROCEDURE — S0612 ANNUAL GYNECOLOGICAL EXAMINA: HCPCS | Performed by: OBSTETRICS & GYNECOLOGY

## 2021-09-30 RX ORDER — SODIUM FLUORIDE 5 MG/G
GEL, DENTIFRICE DENTAL
COMMUNITY

## 2022-04-05 ENCOUNTER — OFFICE VISIT (OUTPATIENT)
Dept: SURGICAL ONCOLOGY | Facility: CLINIC | Age: 62
End: 2022-04-05
Payer: COMMERCIAL

## 2022-04-05 VITALS
OXYGEN SATURATION: 95 % | RESPIRATION RATE: 18 BRPM | WEIGHT: 229.8 LBS | DIASTOLIC BLOOD PRESSURE: 62 MMHG | HEART RATE: 64 BPM | TEMPERATURE: 98.4 F | BODY MASS INDEX: 40.72 KG/M2 | HEIGHT: 63 IN | SYSTOLIC BLOOD PRESSURE: 122 MMHG

## 2022-04-05 DIAGNOSIS — Z80.3 FAMILY HISTORY OF BREAST CANCER IN FEMALE: ICD-10-CM

## 2022-04-05 DIAGNOSIS — Z12.31 SCREENING MAMMOGRAM, ENCOUNTER FOR: ICD-10-CM

## 2022-04-05 DIAGNOSIS — N60.19 FIBROCYSTIC BREAST CHANGES, UNSPECIFIED LATERALITY: Primary | ICD-10-CM

## 2022-04-05 PROBLEM — R92.2 DENSE BREAST TISSUE: Status: RESOLVED | Noted: 2021-04-05 | Resolved: 2022-04-05

## 2022-04-05 PROBLEM — R92.30 DENSE BREAST TISSUE: Status: RESOLVED | Noted: 2021-04-05 | Resolved: 2022-04-05

## 2022-04-05 PROCEDURE — 99213 OFFICE O/P EST LOW 20 MIN: CPT | Performed by: SURGERY

## 2022-04-05 NOTE — PROGRESS NOTES
Surgical Oncology Follow Up       [facility] [address] [address]    Gil Pérez  1960  [id number]  [address]  [facility] [place]  [place] [address]    Chief Complaint   Patient presents with    Follow-up       Assessment/Plan   Diagnoses and all orders for this visit:    Fibrocystic breast changes, unspecified laterality    Family history of breast cancer in female    Screening mammogram, encounter for        Advance Care Planning/Advance Directives:  Did not discuss  with the patient  Oncology History:    Oncology History    No history exists  History of Present Illness: Follow-up visit secondary to family history of breast cancer in her mother and underlying fibrocystic changes, she previously had dense breast tissue which was a category two on the past mammogram  -Interval History:  Benign imaging    Review of Systems:  Review of Systems   Constitutional: Negative  Negative for appetite change and fever  Eyes: Negative  Respiratory: Negative for shortness of breath  Cardiovascular: Negative  Gastrointestinal: Negative  Endocrine: Negative  Genitourinary: Negative  Musculoskeletal: Negative  Negative for arthralgias and myalgias  Skin: Negative  Allergic/Immunologic: Negative  Neurological: Negative  Hematological: Negative  Negative for adenopathy  Does not bruise/bleed easily  Psychiatric/Behavioral: Negative          Patient Active Problem List   Diagnosis    Fibrocystic breast changes    Family history of breast cancer in female   Emaline Folds Screening mammogram, encounter for     Past Medical History:   Diagnosis Date    Asthma     Candidiasis     Depression     Disease of thyroid gland     Dysuria     Fibrocystic breast changes     H/O allergy     Hyperlipidemia     Hypertension     Mitral valve prolapse      Past Surgical History:   Procedure Laterality Date    HEEL SPUR SURGERY Bilateral     [id number]    SHOULDER SURGERY      FROZEN SHOULDER     Family History   Problem Relation Age of Onset   Erick Champion Breast cancer Mother 79        Mother was adopted    Lung cancer Father         UNKNOWN      Social History     Socioeconomic History    Marital status: /Civil Union     Spouse name: Not on file    Number of children: Not on file    Years of education: Not on file    Highest education level: Not on file   Occupational History    Not on file   Tobacco Use    Smoking status: Never Smoker    Smokeless tobacco: Never Used   Vaping Use    Vaping Use: Never used   Substance and Sexual Activity    Alcohol use: No    Drug use: No    Sexual activity: Not Currently     Partners: Male     Birth control/protection: Post-menopausal   Other Topics Concern    Not on file   Social History Narrative    Not on file     Social Determinants of Health     Financial Resource Strain: Not on file   Food Insecurity: Not on file   Transportation Needs: Not on file   Physical Activity: Not on file   Stress: Not on file   Social Connections: Not on file   Intimate Partner Violence: Not on file   Housing Stability: Not on file       Current Outpatient Medications:     albuterol (PROVENTIL HFA,VENTOLIN HFA) 90 mcg/act inhaler, Take 1-2 puffs every 4-6 hours as needed for chest tightness  , Disp: , Rfl:     amLODIPine (NORVASC) 5 mg tablet, TAKE 1 TABLET (5 MG TOTAL) BY MOUTH DAILY  , Disp: , Rfl:     atorvastatin (LIPITOR) 20 mg tablet, atorvastatin 20 mg tablet, Disp: , Rfl:     chlorthalidone 25 mg tablet, TAKE 1 TABLET (25 MG TOTAL) BY MOUTH DAILY  , Disp: , Rfl: 1    Dulaglutide 4 5 MG/0 5ML SOPN, Inject 4 5 mg under the skin Once a week, Disp: , Rfl:     fenofibrate micronized (LOFIBRA) 67 MG capsule, Take by mouth, Disp: , Rfl:     levothyroxine 50 mcg tablet, Take by mouth, Disp: , Rfl:     simvastatin (ZOCOR) 20 mg tablet, Take by mouth, Disp: , Rfl:     SODIUM FLUORIDE, DENTAL GEL, 1 1 % GEL, sodium fluoride 1 1 % dental paste, Disp: , Rfl:   Allergies   Allergen Reactions    Ace Inhibitors Other (See Comments)     unknown    Avelox [Moxifloxacin] Swelling    Erythromycin Other (See Comments)     Cramping, abdominal pain       The following portions of the patient's history were reviewed and updated as appropriate: allergies, current medications, past family history, past medical history, past social history, past surgical history and problem list         Vitals:    04/05/22 1529   BP: 122/62   Pulse: 64   Resp: 18   Temp: 98 4 °F (36 9 °C)   SpO2: 95%       Physical Exam  Constitutional:       General: She is not in acute distress  Appearance: She is well-developed  HENT:      Head: Normocephalic and atraumatic  Chest:   Breasts:      Right: No inverted nipple, mass, nipple discharge, skin change, tenderness, axillary adenopathy or supraclavicular adenopathy  Left: No inverted nipple, mass, nipple discharge, skin change, tenderness, axillary adenopathy or supraclavicular adenopathy  Lymphadenopathy:      Upper Body:      Right upper body: No supraclavicular, axillary or pectoral adenopathy  Left upper body: No supraclavicular, axillary or pectoral adenopathy  Neurological:      Mental Status: She is alert and oriented to person, place, and time  Psychiatric:         Mood and Affect: Mood normal            Results:  Labs:      Imaging  08/13/2021 bilateral 3D screening mammogram is benign BI-RADS one with a density of two    I reviewed the above imaging data  Discussion/Summary:  69-year-old female with fibrocystic changes and family history of breast cancer in her mother  She previously had dense breast tissue which resolved somewhat on the past mammogram   There are no concerns on exam today    She states that she already has an order for her mammogram for this year from her gynecologist   I will therefore plan to see her again in one year for another exam or sooner should the need arise

## 2022-12-06 ENCOUNTER — ANNUAL EXAM (OUTPATIENT)
Dept: OBGYN CLINIC | Facility: MEDICAL CENTER | Age: 62
End: 2022-12-06

## 2022-12-06 VITALS
BODY MASS INDEX: 42.52 KG/M2 | HEIGHT: 63 IN | DIASTOLIC BLOOD PRESSURE: 70 MMHG | SYSTOLIC BLOOD PRESSURE: 128 MMHG | WEIGHT: 240 LBS

## 2022-12-06 DIAGNOSIS — Z01.419 ENCOUNTER FOR WELL WOMAN EXAM WITH ROUTINE GYNECOLOGICAL EXAM: Primary | ICD-10-CM

## 2022-12-06 NOTE — PROGRESS NOTES
[facility] [address] [place] [address]    ASSESSMENT/PLAN: Shelbi Kathleen is a 60 y o  Aguila Ser who presents for annual gynecologic exam   Routine well woman exam completed today  Cervical Cancer Screening: Current ASCCP Guidelines reviewed  Last Pap: 2020  Next Pap Due: 2025  Breast cancer screening: mammo done August 2022; follows with Dr Dany Mayen cancer screening: done 2017      CC: Annual Gynecologic Examination    HPI: Shelbi Kathleen is a 60 y o  Aguila Ser who presents for annual gynecologic examination  No gyn complaints; doing well    Gynecologic Exam        The following portions of the patient's history were reviewed and updated as appropriate: allergies, current medications, past family history, past medical history, obstetric history, gynecologic history, past social history, past surgical history and problem list     Review of Systems   Constitutional: Negative  HENT: Negative  Eyes: Negative  Respiratory: Negative  Cardiovascular: Negative  Gastrointestinal: Negative  Genitourinary: Negative  Musculoskeletal: Negative  All other systems reviewed and are negative  Objective:  /70 (BP Location: Right arm, Patient Position: Sitting, Cuff Size: Adult)   Ht 5' 3" (1 6 m)   Wt 109 kg (240 lb)   BMI 42 51 kg/m²    Physical Exam  Vitals reviewed  Constitutional:       General: She is not in acute distress  Appearance: She is well-developed  HENT:      Head: Normocephalic and atraumatic  Nose: Nose normal    Cardiovascular:      Rate and Rhythm: Normal rate  Pulmonary:      Effort: Pulmonary effort is normal  No respiratory distress  Chest:   Breasts:     Breasts are symmetrical       Right: Normal  No mass, nipple discharge, skin change or tenderness  Left: Normal  No mass, nipple discharge, skin change or tenderness  Abdominal:      General: There is no distension  Palpations: Abdomen is soft  There is no mass  Tenderness: There is no abdominal tenderness  There is no guarding or rebound  Genitourinary:     General: Normal vulva  Exam position: Lithotomy position  Labia:         Right: No lesion  Left: No lesion  Urethra: No prolapse (urethral meatus normal)  Vagina: Normal  No vaginal discharge, erythema or bleeding  Cervix: Normal       Uterus: Normal        Adnexa: Right adnexa normal and left adnexa normal       Comments:     Musculoskeletal:         General: Normal range of motion  Cervical back: Normal range of motion  Lymphadenopathy:      Upper Body:      Right upper body: No supraclavicular, axillary or pectoral adenopathy  Left upper body: No supraclavicular, axillary or pectoral adenopathy  Lower Body: No left inguinal adenopathy  Skin:     General: Skin is warm and dry  Neurological:      Mental Status: She is alert and oriented to person, place, and time  Psychiatric:         Behavior: Behavior normal          Thought Content:  Thought content normal          Judgment: Judgment normal

## 2023-03-15 ENCOUNTER — TELEPHONE (OUTPATIENT)
Dept: SURGICAL ONCOLOGY | Facility: CLINIC | Age: 63
End: 2023-03-15

## 2023-03-15 NOTE — TELEPHONE ENCOUNTER
Called and left a message to call back so I may reschedule her appointment with Dr Carla Gao on 4/5/23

## 2023-03-20 ENCOUNTER — TELEPHONE (OUTPATIENT)
Dept: SURGICAL ONCOLOGY | Facility: CLINIC | Age: 63
End: 2023-03-20

## 2023-03-20 NOTE — TELEPHONE ENCOUNTER
Called and left a message returning her call regarding her 4/5/23 appointment with Dr Delma Hernandez that needs to be rescheduled  Direct number given to return call to  Patient returned call  She is agreeable to rescheduling with the CRNP and requested June so her Gyn and Breast follow up appointment are 6 months apart  Appointment scheduled with Giovanni Mancia on June 9, 2023 at 3:30pm (needs end of day)in the [facility] office

## 2023-06-09 ENCOUNTER — OFFICE VISIT (OUTPATIENT)
Dept: SURGICAL ONCOLOGY | Facility: CLINIC | Age: 63
End: 2023-06-09
Payer: COMMERCIAL

## 2023-06-09 VITALS
WEIGHT: 237.2 LBS | DIASTOLIC BLOOD PRESSURE: 70 MMHG | BODY MASS INDEX: 42.03 KG/M2 | RESPIRATION RATE: 16 BRPM | SYSTOLIC BLOOD PRESSURE: 130 MMHG | OXYGEN SATURATION: 95 % | HEIGHT: 63 IN | TEMPERATURE: 97.8 F | HEART RATE: 72 BPM

## 2023-06-09 DIAGNOSIS — N60.19 FIBROCYSTIC BREAST CHANGES, UNSPECIFIED LATERALITY: ICD-10-CM

## 2023-06-09 DIAGNOSIS — Z80.3 FAMILY HISTORY OF BREAST CANCER IN FEMALE: Primary | ICD-10-CM

## 2023-06-09 PROCEDURE — 99213 OFFICE O/P EST LOW 20 MIN: CPT

## 2023-06-09 RX ORDER — DULAGLUTIDE 3 MG/.5ML
4.5 INJECTION, SOLUTION SUBCUTANEOUS
COMMUNITY
Start: 2023-03-23

## 2023-06-09 RX ORDER — FLUTICASONE PROPIONATE AND SALMETEROL 50; 100 UG/1; UG/1
POWDER RESPIRATORY (INHALATION) AS NEEDED
COMMUNITY
Start: 2023-03-31

## 2023-06-09 NOTE — PROGRESS NOTES
Surgical Oncology Follow Up       [facility] [address]    Ruth Kenney  1960  [id number]  [facility] [place]  [place] [address]    Chief Complaint   Patient presents with   • Follow-up       Assessment/Plan:  1  Family history of breast cancer in female  - PRN    2  Fibrocystic breast changes, unspecified laterality      Discussion/Summary: Patient is a 58-year-old female presenting today for 1 year follow-up for fibrocystic breast and family history of breast cancer in her mother diagnosed in her late her 62s  She had a bilateral diagnostic mammogram on 8/17/2022 which was BI-RADS 1 category 2 density  I calculated patient's lifetime TC risk score which was approximately 12%  I informed her that this is along the average population risk  Patient may follow solely with her GYN annually for clinical breast exam and mammogram   There were no concerns on her clinical breast exam   She was instructed to call with any questions or concerns related to her breast exam   All questions were answered today  History of Present Illness:     Oncology History    No history exists         -Interval History: Patient is a 58-year-old female presenting today for 1 year follow-up for fibrocystic breast and family history of breast cancer in her mother diagnosed in her late her 62s  She had a bilateral diagnostic mammogram on 8/17/2022 which was BI-RADS 1 category 2 density  She denies changes on her breast exam or family hx  Review of Systems:  Review of Systems   Constitutional: Negative for activity change, appetite change, fatigue and unexpected weight change  Respiratory: Negative for cough and shortness of breath  Cardiovascular: Negative for chest pain  Gastrointestinal: Negative for abdominal pain, diarrhea, nausea and vomiting  Endocrine: Negative for heat intolerance  Musculoskeletal: Negative for arthralgias, back pain and myalgias  Skin: Negative for rash  Neurological: Negative for weakness and headaches  Hematological: Negative for adenopathy  Patient Active Problem List   Diagnosis   • Fibrocystic breast changes   • Family history of breast cancer in female   • Screening mammogram, encounter for     Past Medical History:   Diagnosis Date   • Asthma    • Candidiasis    • COPD (chronic obstructive pulmonary disease) ([facility] )    • Depression    • Diabetes mellitus ([facility] ) 9/2018   • Disease of thyroid gland    • Dysuria    • Fibrocystic breast changes    • H/O allergy    • Hyperlipidemia    • Hypertension    • Miscarriage 1988   • Mitral valve prolapse      Past Surgical History:   Procedure Laterality Date   • COLONOSCOPY  11/2017    Dr Theodora Miner  Sigmoid diverticula, otherwise normal exam    • COLONOSCOPY  2010    Dr Theodora Miner  Sigmoid diverticula, mucosal bump ileocecal valve  • EGD  06/2009    Dr Theodora Miner    Schatski's ring, dilated to 47 Fr, otherwise normal exam    • HEEL SPUR SURGERY Bilateral     [id number]   • SHOULDER SURGERY      FROZEN SHOULDER     Family History   Problem Relation Age of Onset   • Breast cancer Mother         Mother was adopted   • Lung cancer Father         UNKNOWN      Social History     Socioeconomic History   • Marital status: /Civil Union     Spouse name: Not on file   • Number of children: Not on file   • Years of education: Not on file   • Highest education level: Not on file   Occupational History   • Occupation:    Tobacco Use   • Smoking status: Never   • Smokeless tobacco: Never   Vaping Use   • Vaping Use: Never used   Substance and Sexual Activity   • Alcohol use: No   • Drug use: No   • Sexual activity: Not Currently     Partners: Male     Birth control/protection: Post-menopausal   Other Topics Concern   • Not on file   Social History Narrative   • Not on file Social Determinants of Health     Financial Resource Strain: Not on file   Food Insecurity: Not on file   Transportation Needs: Not on file   Physical Activity: Not on file   Stress: Not on file   Social Connections: Not on file   Intimate Partner Violence: Not on file   Housing Stability: Not on file       Current Outpatient Medications:   •  Advair Diskus 100-50 MCG/ACT inhaler, if needed, Disp: , Rfl:   •  amLODIPine (NORVASC) 5 mg tablet, TAKE 1 TABLET (5 MG TOTAL) BY MOUTH DAILY  , Disp: , Rfl:   •  atorvastatin (LIPITOR) 20 mg tablet, atorvastatin 20 mg tablet, Disp: , Rfl:   •  chlorthalidone 25 mg tablet, TAKE 1 TABLET (25 MG TOTAL) BY MOUTH DAILY  , Disp: , Rfl: 1  •  Dulaglutide 4 5 MG/0 5ML SOPN, Inject 4 5 mg under the skin Once a week, Disp: , Rfl:   •  fenofibrate micronized (LOFIBRA) 67 MG capsule, Take by mouth, Disp: , Rfl:   •  levothyroxine 50 mcg tablet, Take by mouth, Disp: , Rfl:   •  omeprazole (PriLOSEC) 20 mg delayed release capsule, Take 20 mg by mouth daily, Disp: , Rfl:   •  SODIUM FLUORIDE, DENTAL GEL, 1 1 % GEL, sodium fluoride 1 1 % dental paste, Disp: , Rfl:   •  Trulicity 3 EJ/7 4EA injection, 4 5 mg, Disp: , Rfl:   Allergies   Allergen Reactions   • Ace Inhibitors Other (See Comments)     unknown   • Avelox [Moxifloxacin] Swelling   • Erythromycin Other (See Comments)     Cramping, abdominal pain     Vitals:    06/09/23 1524   BP: 130/70   Pulse: 72   Resp: 16   Temp: 97 8 °F (36 6 °C)   SpO2: 95%       Physical Exam  Constitutional:       General: She is not in acute distress  Appearance: Normal appearance  Cardiovascular:      Rate and Rhythm: Normal rate and regular rhythm  Pulses: Normal pulses  Heart sounds: Normal heart sounds  Pulmonary:      Effort: Pulmonary effort is normal       Breath sounds: Normal breath sounds  Chest:      Chest wall: No mass     Breasts:     Right: No swelling, bleeding, inverted nipple, mass, nipple discharge, skin change or tenderness  Left: No swelling, bleeding, inverted nipple, mass, nipple discharge, skin change or tenderness  Abdominal:      General: Abdomen is flat  Palpations: Abdomen is soft  Lymphadenopathy:      Upper Body:      Right upper body: No supraclavicular, axillary or pectoral adenopathy  Left upper body: No supraclavicular, axillary or pectoral adenopathy  Skin:     General: Skin is warm  Neurological:      General: No focal deficit present  Mental Status: She is alert and oriented to person, place, and time  Psychiatric:         Mood and Affect: Mood normal          Behavior: Behavior normal            Results:    Imaging  No results found  I reviewed the above imaging data  Advance Care Planning/Advance Directives:  Discussed disease status, cancer treatment plans and/or cancer treatment goals with the patient

## 2023-12-12 ENCOUNTER — ANNUAL EXAM (OUTPATIENT)
Dept: OBGYN CLINIC | Facility: MEDICAL CENTER | Age: 63
End: 2023-12-12
Payer: COMMERCIAL

## 2023-12-12 VITALS
WEIGHT: 237 LBS | HEIGHT: 63 IN | SYSTOLIC BLOOD PRESSURE: 140 MMHG | DIASTOLIC BLOOD PRESSURE: 74 MMHG | BODY MASS INDEX: 41.99 KG/M2

## 2023-12-12 DIAGNOSIS — Z01.419 ENCOUNTER FOR WELL WOMAN EXAM WITH ROUTINE GYNECOLOGICAL EXAM: Primary | ICD-10-CM

## 2023-12-12 DIAGNOSIS — Z12.31 ENCOUNTER FOR SCREENING MAMMOGRAM FOR MALIGNANT NEOPLASM OF BREAST: ICD-10-CM

## 2023-12-12 PROCEDURE — S0612 ANNUAL GYNECOLOGICAL EXAMINA: HCPCS | Performed by: OBSTETRICS & GYNECOLOGY

## 2023-12-12 NOTE — PROGRESS NOTES
[facility] [address] [place] [address]    ASSESSMENT/PLAN: Pari Goldberg is a 58 y.o. Mitzi Lyle who presents for annual gynecologic exam.  Routine well woman exam completed today. Cervical Cancer Screening: Current ASCCP Guidelines reviewed. Last Pap: 2020. Next Pap Due: 2025  Breast cancer screening: mammo done August 2023; mammo ordered  Colon cancer screening: done 2017      CC: Annual Gynecologic Examination    HPI: Pari Goldberg is a 58 y.o. Mitzi Barlow who presents for annual gynecologic examination. No gyn complaints; doing well    Gynecologic Exam    Does not need to follow with Dr. Nieves Suggs    The following portions of the patient's history were reviewed and updated as appropriate: allergies, current medications, past family history, past medical history, obstetric history, gynecologic history, past social history, past surgical history and problem list.    Review of Systems   Constitutional: Negative. HENT: Negative. Eyes: Negative. Respiratory: Negative. Cardiovascular: Negative. Gastrointestinal: Negative. Genitourinary: Negative. Musculoskeletal: Negative. All other systems reviewed and are negative. Objective:  /74   Ht 5' 3" (1.6 m)   Wt 108 kg (237 lb)   BMI 41.98 kg/m²    Physical Exam  Vitals reviewed. Constitutional:       General: She is not in acute distress. Appearance: She is well-developed. HENT:      Head: Normocephalic and atraumatic. Nose: Nose normal.   Cardiovascular:      Rate and Rhythm: Normal rate. Pulmonary:      Effort: Pulmonary effort is normal. No respiratory distress. Chest:   Breasts:     Breasts are symmetrical.      Right: Normal. No mass, nipple discharge, skin change or tenderness. Left: Normal. No mass, nipple discharge, skin change or tenderness. Abdominal:      General: There is no distension. Palpations: Abdomen is soft. There is no mass. Tenderness: There is no abdominal tenderness. There is no guarding or rebound. Genitourinary:     General: Normal vulva. Exam position: Lithotomy position. Labia:         Right: No lesion. Left: No lesion. Urethra: No prolapse (urethral meatus normal). Vagina: Normal. No vaginal discharge, erythema or bleeding. Cervix: Normal.      Uterus: Normal.       Adnexa: Right adnexa normal and left adnexa normal.      Comments:     Musculoskeletal:         General: Normal range of motion. Cervical back: Normal range of motion. Lymphadenopathy:      Upper Body:      Right upper body: No supraclavicular, axillary or pectoral adenopathy. Left upper body: No supraclavicular, axillary or pectoral adenopathy. Lower Body: No left inguinal adenopathy. Skin:     General: Skin is warm and dry. Neurological:      Mental Status: She is alert and oriented to person, place, and time. Psychiatric:         Behavior: Behavior normal.         Thought Content:  Thought content normal.         Judgment: Judgment normal.

## 2024-10-22 ENCOUNTER — TELEPHONE (OUTPATIENT)
Age: 64
End: 2024-10-22

## 2024-10-22 NOTE — TELEPHONE ENCOUNTER
Ana Adam from [facility] will be faxing over a medical record release form requesting patient's records.